# Patient Record
Sex: FEMALE | Race: OTHER | HISPANIC OR LATINO | Employment: FULL TIME | URBAN - METROPOLITAN AREA
[De-identification: names, ages, dates, MRNs, and addresses within clinical notes are randomized per-mention and may not be internally consistent; named-entity substitution may affect disease eponyms.]

---

## 2018-11-20 ENCOUNTER — HOSPITAL ENCOUNTER (EMERGENCY)
Facility: HOSPITAL | Age: 38
Discharge: HOME | End: 2018-11-20
Attending: EMERGENCY MEDICINE | Admitting: EMERGENCY MEDICINE
Payer: OTHER MISCELLANEOUS

## 2018-11-20 VITALS
RESPIRATION RATE: 18 BRPM | OXYGEN SATURATION: 99 % | WEIGHT: 205 LBS | HEART RATE: 84 BPM | BODY MASS INDEX: 34.16 KG/M2 | TEMPERATURE: 97.8 F | DIASTOLIC BLOOD PRESSURE: 69 MMHG | SYSTOLIC BLOOD PRESSURE: 112 MMHG | HEIGHT: 65 IN

## 2018-11-20 DIAGNOSIS — Z77.21 EXPOSURE TO BLOOD OR BODY FLUID: Primary | ICD-10-CM

## 2018-11-20 LAB — ALT SERPL W P-5'-P-CCNC: 53 U/L (ref 12–78)

## 2018-11-20 PROCEDURE — 86706 HEP B SURFACE ANTIBODY: CPT | Performed by: EMERGENCY MEDICINE

## 2018-11-20 PROCEDURE — 87340 HEPATITIS B SURFACE AG IA: CPT | Performed by: EMERGENCY MEDICINE

## 2018-11-20 PROCEDURE — 36415 COLL VENOUS BLD VENIPUNCTURE: CPT | Performed by: EMERGENCY MEDICINE

## 2018-11-20 PROCEDURE — 86803 HEPATITIS C AB TEST: CPT | Performed by: EMERGENCY MEDICINE

## 2018-11-20 PROCEDURE — 87389 HIV-1 AG W/HIV-1&-2 AB AG IA: CPT | Performed by: EMERGENCY MEDICINE

## 2018-11-20 PROCEDURE — 84460 ALANINE AMINO (ALT) (SGPT): CPT | Performed by: EMERGENCY MEDICINE

## 2018-11-20 PROCEDURE — 99283 EMERGENCY DEPT VISIT LOW MDM: CPT

## 2018-11-21 LAB
HBV SURFACE AB SER-ACNC: <3.1 MIU/ML
HBV SURFACE AG SER QL: NORMAL
HCV AB SER QL: NORMAL
HIV 1+2 AB+HIV1 P24 AG SERPL QL IA: NORMAL

## 2018-11-21 NOTE — DISCHARGE INSTRUCTIONS
Body Substance Exposure   WHAT YOU NEED TO KNOW:   Body substance exposure is when you come in contact with another person's blood or body fluid that contains blood  Semen or vaginal fluid can also spread infection  Contact may place you at risk for hepatitis B virus (HBV), human immunodeficiency virus (HIV), or hepatitis C virus (HCV)  DISCHARGE INSTRUCTIONS:   Ways that body substance exposure can occur:   · A needle stick or a cut from a sharp object    · Contact with an open wound, such as a cut, chapped skin, or an abrasion     · Contact with the eyes or mucus membrane, such as the lining of the mouth or nose    · Human bite  What to do when exposed to a body substance:   · Clean the area immediately  Wash an open wound with soap and clean water  Flush your eyes with saline solution or water  Rinse mucus membranes with water or saline solution  · Contact your healthcare provider as soon as possible  He will ask how the exposure happened and where the blood or body fluid touched your body  If possible, tell your healthcare provider about the person's health status and history, such as vaccinations  Treatment works best if started as soon as possible  Treatment that may be given for body substance exposure:  Postexposure prophylaxis (PEP) is medical treatment that may protect a person from infection after exposure to another person's body fluids  PEP may be needed if the person whose fluids you were exposed to has a known infection  Do not donate blood, organs, tissues, or semen until your follow-up is completed at 6 months  · PEP for HBV  may include HBV vaccinations or medicine to prevent HVB  This treatment works best if started within 24 hours of exposure  · PEP for HIV  may include 2 or 3 types of medicine to prevent HIV  This treatment works best if started within 72 hours of exposure  Continue treatment for 4 weeks   Practice safe sex to prevent spreading HIV and to prevent pregnancy during the follow-up period  If you are breastfeeding, your healthcare provider may recommend that you stop  Ask your healthcare provider if you can breastfeed  · PEP for HCV  is not  available  You will need to be tested for HCV and treated if you were infected  Follow up with your healthcare provider as directed: You will need more blood tests  PEP for HIV often causes side effects  Talk with your healthcare provider about your symptoms  He will need to make sure you are taking the medicine correctly  Write down your questions so you remember to ask them during your visits  Prevent body substance exposure: If you care for another person who has HBV, HIV, or HCV, protect yourself and others from infection:  · Wash your hands thoroughly  before and after you provide medical care  · Use protective equipment  Gloves or a face mask may protect your hands, nose, and mouth from splashes of blood or body fluid  · Do not recap needles after use  Recapping needles increases your risk of a needle stick  · Throw away needles in a safe container  A hard container with a lid may prevent accidental needle sticks  Contact your healthcare provider if:   · You have a fever  · You have a rash  · You have weakness or muscle pain  · You have abdominal pain, nausea, or vomiting  · You have diarrhea  · You have a headache  · You have questions or concerns about your condition or care  © 2017 [employer]  Information is for End User's use only and may not be sold, redistributed or otherwise used for commercial purposes  All illustrations and images included in CareNotes® are the copyrighted property of [employer]  or Dylan Tolentino  The above information is an  only  It is not intended as medical advice for individual conditions or treatments  Talk to your doctor, nurse or pharmacist before following any medical regimen to see if it is safe and effective for you

## 2018-11-21 NOTE — ED NOTES
This nurse supervised nursing students assessment and is in agreement     Triny Buchanan RN  11/20/18 6650

## 2018-11-23 ENCOUNTER — APPOINTMENT (OUTPATIENT)
Dept: URGENT CARE | Facility: CLINIC | Age: 38
End: 2018-11-23
Payer: OTHER MISCELLANEOUS

## 2018-11-23 PROCEDURE — G0382 LEV 3 HOSP TYPE B ED VISIT: HCPCS | Performed by: PHYSICIAN ASSISTANT

## 2018-11-23 NOTE — ED PROVIDER NOTES
History  Chief Complaint   Patient presents with    Body Fluid Exposure     Patient presents to ED after sticking herself with a clients needle during a blood sugar check this afternoon  40-year-old female patient presents emergency department for evaluation of needlestick injury that occurred at work  The patient works as a med tech, states that she was stuck with a plan set while attempting to check the patient's blood sugar  Patient will have a needlestick exposure panel done the patient will be discharged for follow-up as per her companies needlestick procedure          History provided by:  Patient   used: No    Medical Problem   Severity:  Mild  Onset quality:  Sudden  Timing:  Constant  Chronicity:  New  Associated symptoms: no abdominal pain, no congestion, no cough, no fever, no loss of consciousness and no rash        None       History reviewed  No pertinent past medical history  History reviewed  No pertinent surgical history  History reviewed  No pertinent family history  I have reviewed and agree with the history as documented  Social History   Substance Use Topics    Smoking status: Never Smoker    Smokeless tobacco: Never Used    Alcohol use No        Review of Systems   Constitutional: Negative for fever  HENT: Negative for congestion  Respiratory: Negative for cough  Gastrointestinal: Negative for abdominal pain  Skin: Negative for rash  Neurological: Negative for loss of consciousness  All other systems reviewed and are negative  Physical Exam  Physical Exam   Constitutional: She is oriented to person, place, and time  She appears well-developed and well-nourished  HENT:   Head: Normocephalic and atraumatic  Right Ear: External ear normal    Left Ear: External ear normal    Eyes: Conjunctivae and EOM are normal    Neck: No JVD present  No tracheal deviation present  No thyromegaly present  Cardiovascular: Normal rate  Pulmonary/Chest: Effort normal and breath sounds normal  No stridor  Abdominal: Soft  She exhibits no distension and no mass  There is no tenderness  There is no guarding  No hernia  Musculoskeletal: Normal range of motion  She exhibits no edema, tenderness or deformity  Lymphadenopathy:     She has no cervical adenopathy  Neurological: She is alert and oriented to person, place, and time  Skin: Skin is warm  No rash noted  No erythema  No pallor  Psychiatric: She has a normal mood and affect  Her behavior is normal    Nursing note and vitals reviewed  Vital Signs  ED Triage Vitals [11/20/18 1845]   Temperature Pulse Respirations Blood Pressure SpO2   97 8 °F (36 6 °C) 84 18 112/69 99 %      Temp Source Heart Rate Source Patient Position - Orthostatic VS BP Location FiO2 (%)   Oral Monitor Sitting Left arm --      Pain Score       No Pain           Vitals:    11/20/18 1845   BP: 112/69   Pulse: 84   Patient Position - Orthostatic VS: Sitting       Visual Acuity      ED Medications  Medications - No data to display    Diagnostic Studies  Results Reviewed     Procedure Component Value Units Date/Time    HIV 1/2 AG-AB combo [[id number]]  (Normal) Collected:  11/20/18 2005    Lab Status:  Final result Specimen:  Blood from Arm, Right Updated:  11/21/18 1514     HIV-1/HIV-2 Ab Non-Reactive    Narrative: This test detects HIV 1, HIV2 and p24 Antigen       Hepatitis B surface antigen [[id number]]  (Normal) Collected:  11/20/18 2005    Lab Status:  Final result Specimen:  Blood from Arm, Right Updated:  11/21/18 1244     Hepatitis B Surface Ag Non-reactive    Hepatitis C antibody [[id number]]  (Normal) Collected:  11/20/18 2005    Lab Status:  Final result Specimen:  Blood from Arm, Right Updated:  11/21/18 1244     Hepatitis C Ab Non-reactive    Hepatitis B surface antibody [[id number]] Collected:  11/20/18 2005    Lab Status:  Final result Specimen:  Blood from Arm, Right Updated:  11/21/18 1244 Hep B S Ab <3 10 mIU/mL     ALT [[id number]]  (Normal) Collected:  11/20/18 2005    Lab Status:  Final result Specimen:  Blood from Arm, Right Updated:  11/20/18 2024     ALT 53 U/L                  No orders to display              Procedures  Procedures       Phone Contacts  ED Phone Contact    ED Course                               MDM  Number of Diagnoses or Management Options  Exposure to blood or body fluid: new and requires workup     Amount and/or Complexity of Data Reviewed  Clinical lab tests: ordered  Decide to obtain previous medical records or to obtain history from someone other than the patient: yes  Review and summarize past medical records: yes    Patient Progress  Patient progress: stable    CritCare Time    Disposition  Final diagnoses:   Exposure to blood or body fluid     Time reflects when diagnosis was documented in both MDM as applicable and the Disposition within this note     Time User Action Codes Description Comment    11/20/2018  8:09 PM Clarissa Valentine Add [Z77 21] Exposure to blood or body fluid       ED Disposition     ED Disposition Condition Comment    Discharge  [address] discharge to home/self care  Condition at discharge: Good        Follow-up Information    None         There are no discharge medications for this patient  No discharge procedures on file      ED Provider  Electronically Signed by           Melly Ontiveros DO  11/23/18 7463

## 2021-03-03 ENCOUNTER — HOSPITAL ENCOUNTER (EMERGENCY)
Facility: HOSPITAL | Age: 41
Discharge: HOME | End: 2021-03-03
Attending: EMERGENCY MEDICINE | Admitting: EMERGENCY MEDICINE
Payer: COMMERCIAL

## 2021-03-03 ENCOUNTER — APPOINTMENT (EMERGENCY)
Dept: CT IMAGING | Facility: HOSPITAL | Age: 41
End: 2021-03-03
Payer: COMMERCIAL

## 2021-03-03 VITALS
SYSTOLIC BLOOD PRESSURE: 94 MMHG | TEMPERATURE: 98.2 F | HEART RATE: 79 BPM | BODY MASS INDEX: 34.12 KG/M2 | OXYGEN SATURATION: 98 % | WEIGHT: 205.03 LBS | RESPIRATION RATE: 16 BRPM | DIASTOLIC BLOOD PRESSURE: 64 MMHG

## 2021-03-03 DIAGNOSIS — J03.90 ACUTE TONSILLITIS, UNSPECIFIED ETIOLOGY: Primary | ICD-10-CM

## 2021-03-03 LAB
ANION GAP SERPL CALCULATED.3IONS-SCNC: 7 MMOL/L (ref 4–13)
BASOPHILS # BLD AUTO: 0.05 THOUSANDS/ΜL (ref 0–0.1)
BASOPHILS NFR BLD AUTO: 1 % (ref 0–1)
BUN SERPL-MCNC: 13 MG/DL (ref 5–25)
CALCIUM SERPL-MCNC: 8.9 MG/DL (ref 8.3–10.1)
CHLORIDE SERPL-SCNC: 103 MMOL/L (ref 100–108)
CO2 SERPL-SCNC: 29 MMOL/L (ref 21–32)
CREAT SERPL-MCNC: 0.8 MG/DL (ref 0.6–1.3)
EOSINOPHIL # BLD AUTO: 0.06 THOUSAND/ΜL (ref 0–0.61)
EOSINOPHIL NFR BLD AUTO: 1 % (ref 0–6)
ERYTHROCYTE [DISTWIDTH] IN BLOOD BY AUTOMATED COUNT: 15.2 % (ref 11.6–15.1)
EXT PREG TEST URINE: NEGATIVE
EXT. CONTROL ED NAV: NORMAL
GFR SERPL CREATININE-BSD FRML MDRD: 93 ML/MIN/1.73SQ M
GLUCOSE SERPL-MCNC: 94 MG/DL (ref 65–140)
HCT VFR BLD AUTO: 44.2 % (ref 34.8–46.1)
HGB BLD-MCNC: 14.2 G/DL (ref 11.5–15.4)
IMM GRANULOCYTES # BLD AUTO: 0.02 THOUSAND/UL (ref 0–0.2)
IMM GRANULOCYTES NFR BLD AUTO: 0 % (ref 0–2)
LACTATE SERPL-SCNC: 0.8 MMOL/L (ref 0.5–2)
LYMPHOCYTES # BLD AUTO: 2.73 THOUSANDS/ΜL (ref 0.6–4.47)
LYMPHOCYTES NFR BLD AUTO: 28 % (ref 14–44)
MCH RBC QN AUTO: 27.9 PG (ref 26.8–34.3)
MCHC RBC AUTO-ENTMCNC: 32.1 G/DL (ref 31.4–37.4)
MCV RBC AUTO: 87 FL (ref 82–98)
MONOCYTES # BLD AUTO: 0.48 THOUSAND/ΜL (ref 0.17–1.22)
MONOCYTES NFR BLD AUTO: 5 % (ref 4–12)
NEUTROPHILS # BLD AUTO: 6.53 THOUSANDS/ΜL (ref 1.85–7.62)
NEUTS SEG NFR BLD AUTO: 65 % (ref 43–75)
NRBC BLD AUTO-RTO: 0 /100 WBCS
PLATELET # BLD AUTO: 241 THOUSANDS/UL (ref 149–390)
PMV BLD AUTO: 11.8 FL (ref 8.9–12.7)
POTASSIUM SERPL-SCNC: 3.7 MMOL/L (ref 3.5–5.3)
RBC # BLD AUTO: 5.09 MILLION/UL (ref 3.81–5.12)
S PYO DNA THROAT QL NAA+PROBE: NORMAL
SODIUM SERPL-SCNC: 139 MMOL/L (ref 136–145)
WBC # BLD AUTO: 9.87 THOUSAND/UL (ref 4.31–10.16)

## 2021-03-03 PROCEDURE — 96365 THER/PROPH/DIAG IV INF INIT: CPT

## 2021-03-03 PROCEDURE — 80048 BASIC METABOLIC PNL TOTAL CA: CPT | Performed by: PHYSICIAN ASSISTANT

## 2021-03-03 PROCEDURE — 81025 URINE PREGNANCY TEST: CPT | Performed by: PHYSICIAN ASSISTANT

## 2021-03-03 PROCEDURE — 96361 HYDRATE IV INFUSION ADD-ON: CPT

## 2021-03-03 PROCEDURE — 87040 BLOOD CULTURE FOR BACTERIA: CPT | Performed by: PHYSICIAN ASSISTANT

## 2021-03-03 PROCEDURE — 99284 EMERGENCY DEPT VISIT MOD MDM: CPT | Performed by: PHYSICIAN ASSISTANT

## 2021-03-03 PROCEDURE — 70491 CT SOFT TISSUE NECK W/DYE: CPT

## 2021-03-03 PROCEDURE — 83605 ASSAY OF LACTIC ACID: CPT | Performed by: PHYSICIAN ASSISTANT

## 2021-03-03 PROCEDURE — 36415 COLL VENOUS BLD VENIPUNCTURE: CPT | Performed by: PHYSICIAN ASSISTANT

## 2021-03-03 PROCEDURE — 86308 HETEROPHILE ANTIBODY SCREEN: CPT | Performed by: PHYSICIAN ASSISTANT

## 2021-03-03 PROCEDURE — 87651 STREP A DNA AMP PROBE: CPT | Performed by: PHYSICIAN ASSISTANT

## 2021-03-03 PROCEDURE — 96375 TX/PRO/DX INJ NEW DRUG ADDON: CPT

## 2021-03-03 PROCEDURE — 85025 COMPLETE CBC W/AUTO DIFF WBC: CPT | Performed by: PHYSICIAN ASSISTANT

## 2021-03-03 PROCEDURE — 99284 EMERGENCY DEPT VISIT MOD MDM: CPT

## 2021-03-03 RX ORDER — ACETAMINOPHEN 325 MG/1
650 TABLET ORAL ONCE
Status: COMPLETED | OUTPATIENT
Start: 2021-03-03 | End: 2021-03-03

## 2021-03-03 RX ORDER — CLINDAMYCIN HYDROCHLORIDE 300 MG/1
300 CAPSULE ORAL 3 TIMES DAILY
Qty: 21 CAPSULE | Refills: 0 | Status: SHIPPED | OUTPATIENT
Start: 2021-03-03 | End: 2021-03-10

## 2021-03-03 RX ORDER — DEXAMETHASONE SODIUM PHOSPHATE 10 MG/ML
10 INJECTION, SOLUTION INTRAMUSCULAR; INTRAVENOUS ONCE
Status: COMPLETED | OUTPATIENT
Start: 2021-03-03 | End: 2021-03-03

## 2021-03-03 RX ORDER — CLINDAMYCIN PHOSPHATE 600 MG/50ML
600 INJECTION INTRAVENOUS ONCE
Status: COMPLETED | OUTPATIENT
Start: 2021-03-03 | End: 2021-03-03

## 2021-03-03 RX ORDER — PREDNISONE 1 MG/1
TABLET ORAL
Qty: 21 TABLET | Refills: 0 | Status: SHIPPED | OUTPATIENT
Start: 2021-03-03

## 2021-03-03 RX ADMIN — DEXAMETHASONE SODIUM PHOSPHATE 10 MG: 10 INJECTION, SOLUTION INTRAMUSCULAR; INTRAVENOUS at 12:22

## 2021-03-03 RX ADMIN — ACETAMINOPHEN 650 MG: 325 TABLET ORAL at 14:07

## 2021-03-03 RX ADMIN — SODIUM CHLORIDE 1000 ML: 0.9 INJECTION, SOLUTION INTRAVENOUS at 12:22

## 2021-03-03 RX ADMIN — IOHEXOL 85 ML: 350 INJECTION, SOLUTION INTRAVENOUS at 13:01

## 2021-03-03 RX ADMIN — CLINDAMYCIN IN 5 PERCENT DEXTROSE 600 MG: 12 INJECTION, SOLUTION INTRAVENOUS at 12:22

## 2021-03-03 NOTE — ED PROVIDER NOTES
History  Chief Complaint   Patient presents with    Sore Throat - Complicated     sent by express care for possible abscess in back of throat, denies trouble breathing, reports troble swallowing     36 y o  female presents to ED with chief complaint of sore throat  Onset of symptoms reported as 1 day ago  Location of symptoms is reported as left posterior throat  Quality is reported as aching pain  Severity is reported as moderate  Associated symptoms:    denies for fevers  denies runny nose/congestion  Denies dysphagia  Denies dysphonia  Denies cough  Denies SOB  Denies Rash  Denies neck pain  Denies lip/tongue/facial swelling  Denies Wheezing  Modifiers:  Swallowing exacerbates pain  Context:    Denies recent travel outside the country  denies recent sick contacts  Denies history of frequent throat infections  Was seen at local urgent care for symptoms and referred to ED for evaluation of possible peritonsillar abscess  Medical summary:  Review of past visit history via [employer] demonstrates patient last seen in ed on 11/20/2018 for evaluation of exposure to blood or blood products        History provided by:  Patient   used: No        None       History reviewed  No pertinent past medical history  History reviewed  No pertinent surgical history  History reviewed  No pertinent family history  I have reviewed and agree with the history as documented  E-Cigarette/Vaping     E-Cigarette/Vaping Substances     Social History     Tobacco Use    Smoking status: Never Smoker    Smokeless tobacco: Never Used   Substance Use Topics    Alcohol use: No    Drug use: No       Review of Systems   Constitutional: Negative for activity change, appetite change, chills, diaphoresis, fatigue, fever and unexpected weight change  HENT: Positive for sore throat   Negative for congestion, dental problem, drooling, ear discharge, ear pain, facial swelling, hearing loss, mouth sores, nosebleeds, postnasal drip, rhinorrhea, sinus pressure, sinus pain, sneezing, tinnitus and voice change  Eyes: Negative for photophobia, pain, discharge, redness, itching and visual disturbance  Respiratory: Negative for apnea, cough, choking, chest tightness, shortness of breath, wheezing and stridor  Cardiovascular: Negative for chest pain, palpitations and leg swelling  Gastrointestinal: Negative for abdominal distention, abdominal pain, anal bleeding, blood in stool, constipation, diarrhea, nausea, rectal pain and vomiting  Endocrine: Negative for cold intolerance, heat intolerance, polydipsia, polyphagia and polyuria  Genitourinary: Negative for decreased urine volume, difficulty urinating, dyspareunia, dysuria, enuresis, flank pain, frequency, hematuria, menstrual problem, pelvic pain, urgency, vaginal bleeding, vaginal discharge and vaginal pain  Musculoskeletal: Negative for arthralgias, back pain, gait problem, joint swelling, myalgias, neck pain and neck stiffness  Skin: Negative for color change, pallor, rash and wound  Allergic/Immunologic: Negative for environmental allergies, food allergies and immunocompromised state  Neurological: Negative for dizziness, tremors, seizures, syncope, facial asymmetry, speech difficulty, weakness, light-headedness, numbness and headaches  Hematological: Negative for adenopathy  Does not bruise/bleed easily  Psychiatric/Behavioral: Negative for agitation, confusion, hallucinations, self-injury and suicidal ideas  The patient is not hyperactive  All other systems reviewed and are negative  Physical Exam  Physical Exam  Vitals signs and nursing note reviewed  Constitutional:       General: She is not in acute distress  Appearance: Normal appearance  Comments: BP 94/64 (BP Location: Left arm)   Pulse 79   Temp 98 2 °F (36 8 °C)   Resp 16   SpO2 98%      HENT:      Head: Normocephalic and atraumatic        Right Ear: External ear normal  Left Ear: External ear normal       Nose: Nose normal       Mouth/Throat:      Comments: Posterior pharynx injected and erythematous  Uvula midline without deviation  Left Tonsil erythematous and significantly edematous with purulent exudate present  No drooling  No trismus  Mucous membranes moist and pink with no clinical signs of dehydration  No lip/tongue/facial swelling  No submandibular or sublingual fullness or swelling  Eyes:      General: No scleral icterus  Right eye: No discharge  Left eye: No discharge  Conjunctiva/sclera: Conjunctivae normal    Neck:      Musculoskeletal: Normal range of motion and neck supple  No neck rigidity or muscular tenderness  Cardiovascular:      Rate and Rhythm: Normal rate  Pulses: Normal pulses  Pulmonary:      Effort: Pulmonary effort is normal  No respiratory distress  Musculoskeletal: Normal range of motion  General: No deformity or signs of injury  Lymphadenopathy:      Cervical: Cervical adenopathy present  Skin:     Findings: No erythema or rash  Neurological:      General: No focal deficit present  Mental Status: She is alert and oriented to person, place, and time  Mental status is at baseline  Cranial Nerves: No cranial nerve deficit  Sensory: No sensory deficit  Motor: No weakness     Psychiatric:         Mood and Affect: Mood normal          Behavior: Behavior normal          Vital Signs  ED Triage Vitals   Temperature Pulse Respirations Blood Pressure SpO2   03/03/21 1057 03/03/21 1057 03/03/21 1057 03/03/21 1057 03/03/21 1057   98 2 °F (36 8 °C) 80 18 123/67 100 %      Temp src Heart Rate Source Patient Position - Orthostatic VS BP Location FiO2 (%)   -- 03/03/21 1057 03/03/21 1057 03/03/21 1057 --    Monitor Sitting Left arm       Pain Score       03/03/21 1407       Med Not Given for Pain - for MAR use only           Vitals:    03/03/21 1057 03/03/21 1245 03/03/21 1330   BP: 123/67 120/73 94/64   Pulse: 80 75 79   Patient Position - Orthostatic VS: Sitting Sitting Sitting         Visual Acuity      ED Medications  Medications   sodium chloride 0 9 % bolus 1,000 mL (0 mL Intravenous Stopped 3/3/21 1357)   dexamethasone (PF) (DECADRON) injection 10 mg (10 mg Intravenous Given 3/3/21 1222)   clindamycin (CLEOCIN) IVPB (premix in dextrose) 600 mg 50 mL (0 mg Intravenous Stopped 3/3/21 1301)   iohexol (OMNIPAQUE) 350 MG/ML injection (MULTI-DOSE) 85 mL (85 mL Intravenous Given 3/3/21 1301)   acetaminophen (TYLENOL) tablet 650 mg (650 mg Oral Given 3/3/21 1407)       Diagnostic Studies  Results Reviewed     Procedure Component Value Units Date/Time    Lactic acid [[id number]]  (Normal) Collected: 03/03/21 1227    Lab Status: Final result Specimen: Blood from Arm, Right Updated: 03/03/21 1302     LACTIC ACID 0 8 mmol/L     Narrative:      Result may be elevated if tourniquet was used during collection      Strep A PCR [[id number]]  (Normal) Collected: 03/03/21 1227    Lab Status: Final result Specimen: Throat Updated: 03/03/21 1301     STREP A PCR None Detected    Basic metabolic panel [[id number]] Collected: 03/03/21 1227    Lab Status: Final result Specimen: Blood from Arm, Right Updated: 03/03/21 1245     Sodium 139 mmol/L      Potassium 3 7 mmol/L      Chloride 103 mmol/L      CO2 29 mmol/L      ANION GAP 7 mmol/L      BUN 13 mg/dL      Creatinine 0 80 mg/dL      Glucose 94 mg/dL      Calcium 8 9 mg/dL      eGFR 93 ml/min/1 73sq m     Narrative:      [place] guidelines for Chronic Kidney Disease (CKD):     Stage 1 with normal or high GFR (GFR > 90 mL/min/1 73 square meters)    Stage 2 Mild CKD (GFR = 60-89 mL/min/1 73 square meters)    Stage 3A Moderate CKD (GFR = 45-59 mL/min/1 73 square meters)    Stage 3B Moderate CKD (GFR = 30-44 mL/min/1 73 square meters)    Stage 4 Severe CKD (GFR = 15-29 mL/min/1 73 square meters)    Stage 5 End Stage CKD (GFR <15 mL/min/1 73 square meters)  Note: GFR calculation is accurate only with a steady state creatinine    CBC and differential [[id number]]  (Abnormal) Collected: 03/03/21 1227    Lab Status: Final result Specimen: Blood from Arm, Right Updated: 03/03/21 1235     WBC 9 87 Thousand/uL      RBC 5 09 Million/uL      Hemoglobin 14 2 g/dL      Hematocrit 44 2 %      MCV 87 fL      MCH 27 9 pg      MCHC 32 1 g/dL      RDW 15 2 %      MPV 11 8 fL      Platelets 452 Thousands/uL      nRBC 0 /100 WBCs      Neutrophils Relative 65 %      Immat GRANS % 0 %      Lymphocytes Relative 28 %      Monocytes Relative 5 %      Eosinophils Relative 1 %      Basophils Relative 1 %      Neutrophils Absolute 6 53 Thousands/µL      Immature Grans Absolute 0 02 Thousand/uL      Lymphocytes Absolute 2 73 Thousands/µL      Monocytes Absolute 0 48 Thousand/µL      Eosinophils Absolute 0 06 Thousand/µL      Basophils Absolute 0 05 Thousands/µL     Mononucleosis screen [[id number]] Collected: 03/03/21 1227    Lab Status: In process Specimen: Blood from Arm, Right Updated: 03/03/21 1232    Blood culture #1 [[id number]] Collected: 03/03/21 1227    Lab Status: In process Specimen: Blood from Arm, Right Updated: 03/03/21 1232    Blood culture #2 [[id number]] Collected: 03/03/21 1227    Lab Status: In process Specimen: Blood from Hand, Right Updated: 03/03/21 1232    POCT pregnancy, urine [[id number]]  (Normal) Resulted: 03/03/21 1227    Lab Status: Final result Updated: 03/03/21 1227     EXT PREG TEST UR (Ref: Negative) negative     Control valid                 CT soft tissue neck   Final Result by Tonja Montanez MD (03/03 1329)      Findings consistent with left tonsillitis  No evidence for abscess or significant peritonsillar phlegmon  Otherwise, no airway pathology or pathologic adenopathy              Workstation performed: [id number]                    Procedures  Procedures         ED Course                             SBIRT 22yo+      Most Recent Value SBIRT (23 yo +)   In order to provide better care to our patients, we are screening all of our patients for alcohol and drug use  Would it be okay to ask you these screening questions? Yes Filed at: 03/03/2021 1130   Initial Alcohol Screen: US AUDIT-C    1  How often do you have a drink containing alcohol? 2 Filed at: 03/03/2021 1130   2  How many drinks containing alcohol do you have on a typical day you are drinking? 1 Filed at: 03/03/2021 1130   3b  FEMALE Any Age, or MALE 65+: How often do you have 4 or more drinks on one occassion? 0 Filed at: 03/03/2021 1130   Audit-C Score  3 Filed at: 03/03/2021 1130   HALEY: How many times in the past year have you    Used an illegal drug or used a prescription medication for non-medical reasons? Never Filed at: 03/03/2021 1130                    MDM  Number of Diagnoses or Management Options  Acute tonsillitis, unspecified etiology: new and requires workup  Diagnosis management comments: ddx includes but is not limited to bacterial vs viral pharyngitis, tonsillitis, uvulitis, PTA, viral syndrome, post nasal drip  Seasonal allergies, laryngitis, mono, consider retropharyngeal abscess, epiglottitis, foreign body ingestion  Lab results reviewed  CBC demonstrates normal white blood cell count 9 8, hemoglobin 14 2 hematocrit 44 2 are normal   No anemia  Basic metabolic panel remarkable for BUN of 13 and creatinine 0 80 which are normal   No renal failure  Strep test was negative  Pregnancy test is negative  Lactic acid normal at 0 8  Mono test is pending  CT scan soft tissue of the neck images independently visualized interpreted by me  Radiology report reviewed:VISUALIZED BRAIN PARENCHYMA:  No acute intracranial pathology of the visualized brain parenchyma  VISUALIZED ORBITS AND PARANASAL SINUSES:  Normal      NASAL CAVITY AND NASOPHARYNX:  Minor leftward deviation of the nasal septum   Nasal vault is normal, nasopharynx       SUPRAHYOID NECK: [employer] minor prominence and asymmetric enhancement of the left tonsil   Discrete abscess is not evident   Parapharyngeal, , sublingual and submandibular spaces are normal      INFRAHYOID NECK:  Aryepiglottic folds and piriform sinuses are normal   Normal glottis and subglottic airway  THYROID GLAND:  Unremarkable  PAROTID AND SUBMANDIBULAR GLANDS:  Normal      LYMPH NODES:  No pathologic or enlarged adenopathy  VASCULAR STRUCTURES:  Normal enhancement of the cervical vasculature  THORACIC INLET:  Lung apices and upper mediastinum are unremarkable  BONY STRUCTURES: No acute fracture or destructive osseous lesion  Amount and/or Complexity of Data Reviewed  Clinical lab tests: ordered and reviewed  Tests in the radiology section of CPT®: ordered and reviewed  Discussion of test results with the performing providers: yes  Review and summarize past medical records: yes  Independent visualization of images, tracings, or specimens: yes    Risk of Complications, Morbidity, and/or Mortality  General comments: ED course:  42-year-old female presents to the emergency department with 1 day of left-sided sore throat/throat pain  No prior history of frequent sore throat infections  ED workup demonstrates normal white blood cell count, negative strep test   CT scan of the soft tissue neck demonstrates no acute peritonsillar retropharyngeal abscess  CT scan is remarkable for left-sided tonsillitis without evidence of abscess formation  I discussed diagnosis and treatment plan with the patient  Discussed symptoms most consistent with left-sided tonsillitis  Discussed treatment plan including use of clindamycin for the next 7 days, use of prednisone as a steroid taper  Discussed use of Tylenol or ibuprofen over-the-counter as needed for pain  Encourage fluids  Follow up with primary care physician and ENT in 2-3 days for recheck and further evaluation of symptoms    Extensively reviewed reasons to return to the emergency department including but not limited to worsening throat pain, difficulty breathing, inability to swallow, development of fevers, or any other worsening or worrisome symptoms  Patient verbalized understanding agreement with same  Reviewed reasons to return to ed  Patient verbalized understanding of diagnosis and agreement with discharge plan of care as well as understanding of reasons to return to ed  I have reasonably determine that electronically prescribing a controlled substance would be impractical for the patient to obtain the controlled substance prescribed by electronic prescription or would cause an untimely delay resulting in an adverse impact on the patient's medical condition        Patient was seen during the outbreak of the corona virus epidemic   Resources are limited due to the severity of patient illnesses associated with virus   Testing is also limited at this time   Discussed with patient at the time of this evaluation   Due to the fact that limited resources are available -treatment options are limited  Patient Progress  Patient progress: stable      Disposition  Final diagnoses:   Acute tonsillitis, unspecified etiology     Time reflects when diagnosis was documented in both MDM as applicable and the Disposition within this note     Time User Action Codes Description Comment    3/3/2021  1:49 PM Lynnea Bosworth Add [J03 [id number]] Acute tonsillitis, unspecified etiology       ED Disposition     ED Disposition Condition Date/Time Comment    Discharge Stable Wed Mar 3, 2021  1:49 PM Aura Alberto discharge to home/self care              Follow-up Information     Follow up With Specialties Details Why Contact Info Additional Information    Grant Marino MD Internal Medicine Call in 1 day for further evaluation of symptoms [address] [facility] 1600 [facility] Emergency Medicine Go to  If symptoms worsen 34 [facility] 109 [facility] Emergency Department, [address], [address] [place] [address], [id number]    Avis Elkins MD Otolaryngology Call in 1 day for further evaluation of symptoms [phone] [facility] 119 [facility]             Discharge Medication List as of 3/3/2021  1:52 PM      START taking these medications    Details   clindamycin (CLEOCIN) 300 MG capsule Take 1 capsule (300 mg total) by mouth 3 (three) times a day for 7 days, Starting Wed 3/3/2021, Until Wed 3/10/2021, Print      predniSONE 5 mg tablet 40 mg PO day 1, then 30 mg PO day 2, 20 mg PO day 3, 10 mg PO day 4, 5 mg PO day 5 then stop, Print           No discharge procedures on file      PDMP Review     None          ED Provider  Electronically Signed by           Erika Cabrera PA-C  03/03/21 1025

## 2021-03-04 LAB — HETEROPH AB SER QL: NEGATIVE

## 2021-03-08 LAB
BACTERIA BLD CULT: NORMAL
BACTERIA BLD CULT: NORMAL

## 2021-11-07 ENCOUNTER — HOSPITAL ENCOUNTER (EMERGENCY)
Facility: HOSPITAL | Age: 41
Discharge: HOME | End: 2021-11-07
Attending: EMERGENCY MEDICINE
Payer: COMMERCIAL

## 2021-11-07 ENCOUNTER — APPOINTMENT (EMERGENCY)
Dept: RADIOLOGY | Facility: HOSPITAL | Age: 41
End: 2021-11-07
Payer: COMMERCIAL

## 2021-11-07 VITALS
SYSTOLIC BLOOD PRESSURE: 103 MMHG | DIASTOLIC BLOOD PRESSURE: 72 MMHG | WEIGHT: 221.56 LBS | HEART RATE: 76 BPM | TEMPERATURE: 98 F | OXYGEN SATURATION: 98 % | RESPIRATION RATE: 13 BRPM

## 2021-11-07 DIAGNOSIS — R07.9 CHEST PAIN: Primary | ICD-10-CM

## 2021-11-07 LAB
ALBUMIN SERPL BCP-MCNC: 3.4 G/DL (ref 3.5–5)
ALP SERPL-CCNC: 67 U/L (ref 46–116)
ALT SERPL W P-5'-P-CCNC: 19 U/L (ref 12–78)
ANION GAP SERPL CALCULATED.3IONS-SCNC: 9 MMOL/L (ref 4–13)
AST SERPL W P-5'-P-CCNC: 13 U/L (ref 5–45)
ATRIAL RATE: 84 BPM
BASOPHILS # BLD AUTO: 0.04 THOUSANDS/ΜL (ref 0–0.1)
BASOPHILS NFR BLD AUTO: 1 % (ref 0–1)
BILIRUB SERPL-MCNC: 0.58 MG/DL (ref 0.2–1)
BUN SERPL-MCNC: 14 MG/DL (ref 5–25)
CALCIUM ALBUM COR SERPL-MCNC: 8.5 MG/DL (ref 8.3–10.1)
CALCIUM SERPL-MCNC: 8 MG/DL (ref 8.3–10.1)
CHLORIDE SERPL-SCNC: 106 MMOL/L (ref 100–108)
CO2 SERPL-SCNC: 25 MMOL/L (ref 21–32)
CREAT SERPL-MCNC: 0.71 MG/DL (ref 0.6–1.3)
EOSINOPHIL # BLD AUTO: 0.06 THOUSAND/ΜL (ref 0–0.61)
EOSINOPHIL NFR BLD AUTO: 1 % (ref 0–6)
ERYTHROCYTE [DISTWIDTH] IN BLOOD BY AUTOMATED COUNT: 15.2 % (ref 11.6–15.1)
GFR SERPL CREATININE-BSD FRML MDRD: 107 ML/MIN/1.73SQ M
GLUCOSE SERPL-MCNC: 95 MG/DL (ref 65–140)
HCT VFR BLD AUTO: 40.1 % (ref 34.8–46.1)
HGB BLD-MCNC: 13.2 G/DL (ref 11.5–15.4)
IMM GRANULOCYTES # BLD AUTO: 0.03 THOUSAND/UL (ref 0–0.2)
IMM GRANULOCYTES NFR BLD AUTO: 0 % (ref 0–2)
LIPASE SERPL-CCNC: 100 U/L (ref 73–393)
LYMPHOCYTES # BLD AUTO: 2.15 THOUSANDS/ΜL (ref 0.6–4.47)
LYMPHOCYTES NFR BLD AUTO: 29 % (ref 14–44)
MCH RBC QN AUTO: 28.6 PG (ref 26.8–34.3)
MCHC RBC AUTO-ENTMCNC: 32.9 G/DL (ref 31.4–37.4)
MCV RBC AUTO: 87 FL (ref 82–98)
MONOCYTES # BLD AUTO: 0.52 THOUSAND/ΜL (ref 0.17–1.22)
MONOCYTES NFR BLD AUTO: 7 % (ref 4–12)
NEUTROPHILS # BLD AUTO: 4.51 THOUSANDS/ΜL (ref 1.85–7.62)
NEUTS SEG NFR BLD AUTO: 62 % (ref 43–75)
NRBC BLD AUTO-RTO: 0 /100 WBCS
P AXIS: 36 DEGREES
PLATELET # BLD AUTO: 215 THOUSANDS/UL (ref 149–390)
PMV BLD AUTO: 11.5 FL (ref 8.9–12.7)
POTASSIUM SERPL-SCNC: 3.7 MMOL/L (ref 3.5–5.3)
PR INTERVAL: 148 MS
PROT SERPL-MCNC: 7 G/DL (ref 6.4–8.2)
QRS AXIS: 56 DEGREES
QRSD INTERVAL: 68 MS
QT INTERVAL: 376 MS
QTC INTERVAL: 444 MS
RBC # BLD AUTO: 4.61 MILLION/UL (ref 3.81–5.12)
SODIUM SERPL-SCNC: 140 MMOL/L (ref 136–145)
T WAVE AXIS: -3 DEGREES
TROPONIN I SERPL-MCNC: <0.02 NG/ML
VENTRICULAR RATE: 84 BPM
WBC # BLD AUTO: 7.31 THOUSAND/UL (ref 4.31–10.16)

## 2021-11-07 PROCEDURE — 85025 COMPLETE CBC W/AUTO DIFF WBC: CPT | Performed by: PHYSICIAN ASSISTANT

## 2021-11-07 PROCEDURE — 99284 EMERGENCY DEPT VISIT MOD MDM: CPT | Performed by: PHYSICIAN ASSISTANT

## 2021-11-07 PROCEDURE — 99284 EMERGENCY DEPT VISIT MOD MDM: CPT

## 2021-11-07 PROCEDURE — 71045 X-RAY EXAM CHEST 1 VIEW: CPT

## 2021-11-07 PROCEDURE — 80053 COMPREHEN METABOLIC PANEL: CPT | Performed by: PHYSICIAN ASSISTANT

## 2021-11-07 PROCEDURE — 84484 ASSAY OF TROPONIN QUANT: CPT | Performed by: PHYSICIAN ASSISTANT

## 2021-11-07 PROCEDURE — 36415 COLL VENOUS BLD VENIPUNCTURE: CPT | Performed by: PHYSICIAN ASSISTANT

## 2021-11-07 PROCEDURE — 93005 ELECTROCARDIOGRAM TRACING: CPT

## 2021-11-07 PROCEDURE — 93010 ELECTROCARDIOGRAM REPORT: CPT | Performed by: INTERNAL MEDICINE

## 2021-11-07 PROCEDURE — 83690 ASSAY OF LIPASE: CPT | Performed by: PHYSICIAN ASSISTANT

## 2025-01-04 ENCOUNTER — HOSPITAL ENCOUNTER (EMERGENCY)
Facility: HOSPITAL | Age: 45
Discharge: HOME | End: 2025-01-04
Attending: EMERGENCY MEDICINE
Payer: COMMERCIAL

## 2025-01-04 VITALS
RESPIRATION RATE: 20 BRPM | OXYGEN SATURATION: 96 % | DIASTOLIC BLOOD PRESSURE: 56 MMHG | WEIGHT: 220.24 LBS | BODY MASS INDEX: 36.65 KG/M2 | TEMPERATURE: 98 F | HEART RATE: 78 BPM | SYSTOLIC BLOOD PRESSURE: 108 MMHG

## 2025-01-04 DIAGNOSIS — R11.2 NAUSEA & VOMITING: Primary | ICD-10-CM

## 2025-01-04 DIAGNOSIS — R19.7 DIARRHEA: ICD-10-CM

## 2025-01-04 LAB
ALBUMIN SERPL BCG-MCNC: 4.2 G/DL (ref 3.5–5)
ALP SERPL-CCNC: 72 U/L (ref 34–104)
ALT SERPL W P-5'-P-CCNC: 12 U/L (ref 7–52)
ANION GAP SERPL CALCULATED.3IONS-SCNC: 7 MMOL/L (ref 4–13)
AST SERPL W P-5'-P-CCNC: 13 U/L (ref 13–39)
BILIRUB SERPL-MCNC: 0.73 MG/DL (ref 0.2–1)
BUN SERPL-MCNC: 18 MG/DL (ref 5–25)
CALCIUM SERPL-MCNC: 8.9 MG/DL (ref 8.4–10.2)
CHLORIDE SERPL-SCNC: 108 MMOL/L (ref 96–108)
CO2 SERPL-SCNC: 21 MMOL/L (ref 21–32)
CREAT SERPL-MCNC: 0.7 MG/DL (ref 0.6–1.3)
FLUAV AG UPPER RESP QL IA.RAPID: NEGATIVE
FLUBV AG UPPER RESP QL IA.RAPID: NEGATIVE
GFR SERPL CREATININE-BSD FRML MDRD: 105 ML/MIN/1.73SQ M
GLUCOSE SERPL-MCNC: 128 MG/DL (ref 65–140)
POTASSIUM SERPL-SCNC: 4.3 MMOL/L (ref 3.5–5.3)
PROT SERPL-MCNC: 7.3 G/DL (ref 6.4–8.4)
SARS-COV+SARS-COV-2 AG RESP QL IA.RAPID: NEGATIVE
SODIUM SERPL-SCNC: 136 MMOL/L (ref 135–147)

## 2025-01-04 PROCEDURE — 36415 COLL VENOUS BLD VENIPUNCTURE: CPT

## 2025-01-04 PROCEDURE — 96375 TX/PRO/DX INJ NEW DRUG ADDON: CPT

## 2025-01-04 PROCEDURE — 99283 EMERGENCY DEPT VISIT LOW MDM: CPT

## 2025-01-04 PROCEDURE — 96361 HYDRATE IV INFUSION ADD-ON: CPT

## 2025-01-04 PROCEDURE — 87804 INFLUENZA ASSAY W/OPTIC: CPT

## 2025-01-04 PROCEDURE — 80053 COMPREHEN METABOLIC PANEL: CPT

## 2025-01-04 PROCEDURE — 99284 EMERGENCY DEPT VISIT MOD MDM: CPT

## 2025-01-04 PROCEDURE — 87811 SARS-COV-2 COVID19 W/OPTIC: CPT

## 2025-01-04 PROCEDURE — 96374 THER/PROPH/DIAG INJ IV PUSH: CPT

## 2025-01-04 RX ORDER — FAMOTIDINE 10 MG/ML
20 INJECTION, SOLUTION INTRAVENOUS ONCE
Status: COMPLETED | OUTPATIENT
Start: 2025-01-04 | End: 2025-01-04

## 2025-01-04 RX ORDER — ONDANSETRON 4 MG/1
4 TABLET, FILM COATED ORAL EVERY 6 HOURS
Qty: 25 TABLET | Refills: 0 | Status: SHIPPED | OUTPATIENT
Start: 2025-01-04

## 2025-01-04 RX ORDER — ONDANSETRON 2 MG/ML
4 INJECTION INTRAMUSCULAR; INTRAVENOUS ONCE
Status: COMPLETED | OUTPATIENT
Start: 2025-01-04 | End: 2025-01-04

## 2025-01-04 RX ADMIN — FAMOTIDINE 20 MG: 10 INJECTION, SOLUTION INTRAVENOUS at 09:17

## 2025-01-04 RX ADMIN — ONDANSETRON 4 MG: 2 INJECTION INTRAMUSCULAR; INTRAVENOUS at 09:17

## 2025-01-04 RX ADMIN — SODIUM CHLORIDE 1000 ML: 0.9 INJECTION, SOLUTION INTRAVENOUS at 09:22

## 2025-01-04 NOTE — DISCHARGE INSTRUCTIONS
Today I provided prescription for Zofran.  Take as directed for nausea, vomiting.  Make sure to stay hydrated in the coming days.  This will help you feel better sooner.  Follow-up with primary care provider as needed.  Return to ED for new or worsening symptoms.

## 2025-01-04 NOTE — ED PROVIDER NOTES
Time reflects when diagnosis was documented in both MDM as applicable and the Disposition within this note       Time User Action Codes Description Comment    1/4/2025 10:35 AM Maurilio Leonardo Add [R11.2] Nausea & vomiting     1/4/2025 10:35 AM Maurilio Leonardo [R19.7] Diarrhea           ED Disposition       ED Disposition   Discharge    Condition   Stable    Date/Time   Sat Jan 4, 2025 10:35 AM    Comment   Marcelina Abilio discharge to home/self care.                   Assessment & Plan       Medical Decision Making  44-year-old female presents to ED for evaluation of nausea, vomiting, diarrhea as seen in HPI.  On physical examination patient vital signs stable.  Normotensive.  Afebrile.  Nontachycardic.  Nonhypoxic.  No focal area of tenderness palpation of abdomen.  Extremities well-perfused.  No rash.  No murmur.  Normal breath sounds.  Patient's presentation and examination consistent with a viral GI infection.  Obtained CMP to check for electrolyte disturbance.  No abnormality of electrolytes or kidney injury seen.  Obtained flu/COVID swab which returned negative.  Provided patient with symptomatic relief in the ED with IV fluids, Pepcid, Zofran.  Patient tolerating p.o. intake after Zofran, feeling better.  Plan to discharge with prescription for Zofran.  Encouraged fluid intake, advancing diet as tolerated in the coming days.  Return to ED for new or worsening symptoms.  Patient agreeable to plan.  Patient discharged.    Prior to discharge, discharge instructions were discussed with patient at bedside. Patient was provided both verbal and written instructions. Patient is understanding of the discharge instructions and is agreeable to plan of care. Return precautions were discussed with patient bedside, patient verbalized understanding of signs and symptoms that would necessitate return to the ED. All questions were answered. Patient was comfortable with the plan of care and discharged to home.    Portions of  "this chart may have been written with voice recognition software.  Occasional grammatical errors, wrong word or \"sound a like\" substitutions may have occurred due to software limitations.  Please read carefully and use context to recognize where substitutions have occurred.     Amount and/or Complexity of Data Reviewed  Labs: ordered.    Risk  Prescription drug management.             Medications   sodium chloride 0.9 % bolus 1,000 mL (0 mL Intravenous Stopped 1/4/25 1022)   ondansetron (ZOFRAN) injection 4 mg (4 mg Intravenous Given 1/4/25 0917)   Famotidine (PF) (PEPCID) injection 20 mg (20 mg Intravenous Given 1/4/25 0917)       ED Risk Strat Scores                          SBIRT 22yo+      Flowsheet Row Most Recent Value   Initial Alcohol Screen: US AUDIT-C     1. How often do you have a drink containing alcohol? 0 Filed at: 01/04/2025 0925   2. How many drinks containing alcohol do you have on a typical day you are drinking?  0 Filed at: 01/04/2025 0925   3b. FEMALE Any Age, or MALE 65+: How often do you have 4 or more drinks on one occassion? 0 Filed at: 01/04/2025 0925   Audit-C Score 0 Filed at: 01/04/2025 0925   HALEY: How many times in the past year have you...    Used an illegal drug or used a prescription medication for non-medical reasons? Never Filed at: 01/04/2025 0925                            History of Present Illness       Chief Complaint   Patient presents with    Vomiting     Pt with vomiting and diarrhea since 0200 this morning.        Past Medical History:   Diagnosis Date    Anxiety       Past Surgical History:   Procedure Laterality Date    REDUCTION MAMMAPLASTY        History reviewed. No pertinent family history.   Social History     Tobacco Use    Smoking status: Never    Smokeless tobacco: Never   Vaping Use    Vaping status: Never Used   Substance Use Topics    Alcohol use: Never    Drug use: Never      E-Cigarette/Vaping    E-Cigarette Use Never User       E-Cigarette/Vaping Substances "      I have reviewed and agree with the history as documented.     44-year-old female presents to ED for evaluation of nausea, vomiting, diarrhea.  Symptoms started around 2 AM.  Patient's son at home has same symptoms that started yesterday.  Patient has been unable to tolerate p.o. intake since symptom onset.  States that her mouth feels dry.  Denies cough, fever, shortness of breath, chest pain, sore throat, nasal congestion, dysuria, hematuria, increased urinary frequency.  Denies blood in vomit, stool.  Denies consuming abnormal foods recently.  No other concerns today.        Review of Systems   Gastrointestinal:  Positive for diarrhea, nausea and vomiting.   All other systems reviewed and are negative.          Objective       ED Triage Vitals [01/04/25 0859]   Temperature Pulse Blood Pressure Respirations SpO2 Patient Position - Orthostatic VS   98 °F (36.7 °C) 77 106/66 20 100 % Sitting      Temp Source Heart Rate Source BP Location FiO2 (%) Pain Score    Temporal Monitor Left arm -- --      Vitals      Date and Time Temp Pulse SpO2 Resp BP Pain Score FACES Pain Rating User   01/04/25 0925 -- 78 96 % 20 108/56 -- -- SG   01/04/25 0859 98 °F (36.7 °C) 77 100 % 20 106/66 -- -- MS            Physical Exam  Vitals and nursing note reviewed.   Constitutional:       General: She is not in acute distress.     Appearance: She is well-developed. She is ill-appearing. She is not toxic-appearing or diaphoretic.   HENT:      Head: Normocephalic and atraumatic.   Eyes:      Conjunctiva/sclera: Conjunctivae normal.   Cardiovascular:      Rate and Rhythm: Normal rate and regular rhythm.      Pulses: Normal pulses.      Heart sounds: Normal heart sounds. No murmur heard.     No friction rub. No gallop.   Pulmonary:      Effort: Pulmonary effort is normal. No respiratory distress.      Breath sounds: Normal breath sounds. No stridor. No wheezing, rhonchi or rales.   Abdominal:      Palpations: Abdomen is soft.       Tenderness: There is no abdominal tenderness.   Musculoskeletal:         General: No swelling.      Cervical back: Neck supple.   Skin:     General: Skin is warm and dry.      Capillary Refill: Capillary refill takes less than 2 seconds.   Neurological:      Mental Status: She is alert.   Psychiatric:         Mood and Affect: Mood normal.         Results Reviewed       Procedure Component Value Units Date/Time    FLU/COVID Rapid Antigen (30 min. TAT) - Preferred screening test in ED [[id number]]  (Normal) Collected: 01/04/25 0922    Lab Status: Final result Specimen: Nares from Nose Updated: 01/04/25 0953     SARS COV Rapid Antigen Negative     Influenza A Rapid Antigen Negative     Influenza B Rapid Antigen Negative    Narrative:      This test has been performed using the [employer] Stacie 2 FLU+SARS Antigen test under the Emergency Use Authorization (EUA). This test has been validated by the  and verified by the performing laboratory. The Stacie uses lateral flow immunofluorescent sandwich assay to detect SARS-COV, Influenza A and Influenza B Antigen.     The [employer] Stacie 2 SARS Antigen test does not differentiate between SARS-CoV and SARS-CoV-2.     Negative results are presumptive and may be confirmed with a molecular assay, if necessary, for patient management. Negative results do not rule out SARS-CoV-2 or influenza infection and should not be used as the sole basis for treatment or patient management decisions. A negative test result may occur if the level of antigen in a sample is below the limit of detection of this test.     Positive results are indicative of the presence of viral antigens, but do not rule out bacterial infection or co-infection with other viruses.     All test results should be used as an adjunct to clinical observations and other information available to the provider.    FOR PEDIATRIC PATIENTS - copy/paste COVID Guidelines URL to browser:  [email or web address]    Comprehensive metabolic panel [[id number]] Collected: 25    Lab Status: Final result Specimen: Blood from Arm, Right Updated: 25     Sodium 136 mmol/L      Potassium 4.3 mmol/L      Chloride 108 mmol/L      CO2 21 mmol/L      ANION GAP 7 mmol/L      BUN 18 mg/dL      Creatinine 0.70 mg/dL      Glucose 128 mg/dL      Calcium 8.9 mg/dL      AST 13 U/L      ALT 12 U/L      Alkaline Phosphatase 72 U/L      Total Protein 7.3 g/dL      Albumin 4.2 g/dL      Total Bilirubin 0.73 mg/dL      eGFR 105 ml/min/1.73sq m     Narrative:      National Kid[employer] guidelines for Chronic Kidney Disease (CKD):     Stage 1 with normal or high GFR (GFR > 90 mL/min/1.73 square meters)    Stage 2 Mild CKD (GFR = 60-89 mL/min/1.73 square meters)    Stage 3A Moderate CKD (GFR = 45-59 mL/min/1.73 square meters)    Stage 3B Moderate CKD (GFR = 30-44 mL/min/1.73 square meters)    Stage 4 Severe CKD (GFR = 15-29 mL/min/1.73 square meters)    Stage 5 End Stage CKD (GFR <15 mL/min/1.73 square meters)  Note: GFR calculation is accurate only with a steady state creatinine            No orders to display       Procedures    ED Medication and Procedure Management   Prior to Admission Medications   Prescriptions Last Dose Informant Patient Reported? Taking?   predniSONE 5 mg tablet   No No   Si mg PO day 1, then 30 mg PO day 2, 20 mg PO day 3, 10 mg PO day 4, 5 mg PO day 5 then stop      Facility-Administered Medications: None     Discharge Medication List as of 2025 10:36 AM        START taking these medications    Details   ondansetron (ZOFRAN) 4 mg tablet Take 1 tablet (4 mg total) by mouth every 6 (six) hours, Starting Sat 2025, Normal           CONTINUE these medications which have NOT CHANGED    Details   predniSONE 5 mg tablet 40 mg PO day 1, then 30 mg PO day 2, 20 mg PO day 3, 10 mg PO day 4, 5 mg PO day 5 then stop,  Print           No discharge procedures on file.  ED SEPSIS DOCUMENTATION   Time reflects when diagnosis was documented in both MDM as applicable and the Disposition within this note       Time User Action Codes Description Comment    1/4/2025 10:35 AM Maurilio Leonardo Add [R11.2] Nausea & vomiting     1/4/2025 10:35 AM Maurilio Leonardo Add [R19.7] Diarrhea                  Maurilio Leonardo PA-C  01/04/25 1515

## 2025-01-04 NOTE — Clinical Note
Marcelina Knox was seen and treated in our emergency department on 1/4/2025.                Diagnosis:     Marcelina  .    She may return on this date:     Marcelina was seen in the ED for evaluation of illness.  Please excuse her from work on Saturday, January 4 and Sunday, January 5, 2025.  Thank you.     If you have any questions or concerns, please don't hesitate to call.      Maurilio Leonardo PA-C    ______________________________           _______________          _______________  Hospital Representative                              Date                                Time